# Patient Record
Sex: MALE | Race: WHITE | Employment: UNEMPLOYED | URBAN - METROPOLITAN AREA
[De-identification: names, ages, dates, MRNs, and addresses within clinical notes are randomized per-mention and may not be internally consistent; named-entity substitution may affect disease eponyms.]

---

## 2018-07-12 ENCOUNTER — HOSPITAL ENCOUNTER (OUTPATIENT)
Age: 63
Discharge: HOME | End: 2018-07-14
Attending: EMERGENCY MEDICINE | Admitting: INTERNAL MEDICINE
Payer: MEDICARE

## 2018-07-12 DIAGNOSIS — T42.6X1A VALPROIC ACID TOXICITY, ACCIDENTAL OR UNINTENTIONAL, INITIAL ENCOUNTER: ICD-10-CM

## 2018-07-12 DIAGNOSIS — D72.819 LEUKOPENIA, UNSPECIFIED TYPE: ICD-10-CM

## 2018-07-12 DIAGNOSIS — D75.839 THROMBOCYTHEMIA: Primary | ICD-10-CM

## 2018-07-12 PROBLEM — D61.818 PANCYTOPENIA (HCC): Status: ACTIVE | Noted: 2018-07-12

## 2018-07-12 PROBLEM — I10 HTN (HYPERTENSION): Status: ACTIVE | Noted: 2018-07-12

## 2018-07-12 PROBLEM — K21.9 GERD (GASTROESOPHAGEAL REFLUX DISEASE): Status: ACTIVE | Noted: 2018-07-12

## 2018-07-12 PROBLEM — F31.9 BIPOLAR 1 DISORDER (HCC): Status: ACTIVE | Noted: 2018-07-12

## 2018-07-12 PROBLEM — R56.9 SEIZURES (HCC): Status: ACTIVE | Noted: 2018-07-12

## 2018-07-12 LAB
ALBUMIN SERPL-MCNC: 2.9 G/DL (ref 3.2–4.6)
ALBUMIN/GLOB SERPL: 0.7 {RATIO} (ref 1.2–3.5)
ALP SERPL-CCNC: 100 U/L (ref 50–136)
ALT SERPL-CCNC: 79 U/L (ref 12–65)
ANION GAP SERPL CALC-SCNC: 5 MMOL/L (ref 7–16)
APTT PPP: 30.7 SEC (ref 23.2–35.3)
AST SERPL-CCNC: 60 U/L (ref 15–37)
BASOPHILS # BLD: 0 K/UL (ref 0–0.2)
BASOPHILS NFR BLD: 0 % (ref 0–2)
BILIRUB SERPL-MCNC: 0.4 MG/DL (ref 0.2–1.1)
BUN SERPL-MCNC: 7 MG/DL (ref 8–23)
CALCIUM SERPL-MCNC: 8.1 MG/DL (ref 8.3–10.4)
CHLORIDE SERPL-SCNC: 100 MMOL/L (ref 98–107)
CO2 SERPL-SCNC: 30 MMOL/L (ref 21–32)
CREAT SERPL-MCNC: 0.72 MG/DL (ref 0.8–1.5)
DIFFERENTIAL METHOD BLD: ABNORMAL
EOSINOPHIL # BLD: 0.1 K/UL (ref 0–0.8)
EOSINOPHIL NFR BLD: 2 % (ref 0.5–7.8)
ERYTHROCYTE [DISTWIDTH] IN BLOOD BY AUTOMATED COUNT: 14.4 % (ref 11.9–14.6)
FOLATE SERPL-MCNC: 19.1 NG/ML (ref 3.1–17.5)
GLOBULIN SER CALC-MCNC: 3.9 G/DL (ref 2.3–3.5)
GLUCOSE BLD STRIP.AUTO-MCNC: 162 MG/DL (ref 65–100)
GLUCOSE SERPL-MCNC: 120 MG/DL (ref 65–100)
HCT VFR BLD AUTO: 41.1 % (ref 41.1–50.3)
HGB BLD-MCNC: 14 G/DL (ref 13.6–17.2)
HGB RETIC QN AUTO: 37 PG (ref 29–35)
IMM GRANULOCYTES # BLD: 0 K/UL (ref 0–0.5)
IMM GRANULOCYTES NFR BLD AUTO: 1 % (ref 0–5)
IMM RETICS NFR: 5.6 % (ref 2.3–13.4)
INR PPP: 1.2
LYMPHOCYTES # BLD: 0.5 K/UL (ref 0.5–4.6)
LYMPHOCYTES NFR BLD: 20 % (ref 13–44)
MAGNESIUM SERPL-MCNC: 1.9 MG/DL (ref 1.8–2.4)
MCH RBC QN AUTO: 32.4 PG (ref 26.1–32.9)
MCHC RBC AUTO-ENTMCNC: 34.1 G/DL (ref 31.4–35)
MCV RBC AUTO: 95.1 FL (ref 79.6–97.8)
MONOCYTES # BLD: 0.3 K/UL (ref 0.1–1.3)
MONOCYTES NFR BLD: 12 % (ref 4–12)
NEUTS SEG # BLD: 1.7 K/UL (ref 1.7–8.2)
NEUTS SEG NFR BLD: 65 % (ref 43–78)
PHOSPHATE SERPL-MCNC: 4.3 MG/DL (ref 2.3–3.7)
PLATELET # BLD AUTO: 30 K/UL (ref 150–450)
PMV BLD AUTO: 10.5 FL (ref 10.8–14.1)
POTASSIUM SERPL-SCNC: 3.9 MMOL/L (ref 3.5–5.1)
PROT SERPL-MCNC: 6.8 G/DL (ref 6.3–8.2)
PROTHROMBIN TIME: 15.1 SEC (ref 11.5–14.5)
RBC # BLD AUTO: 4.32 M/UL (ref 4.23–5.67)
RETICS # AUTO: 0.07 M/UL (ref 0.03–0.1)
RETICS/RBC NFR AUTO: 1.5 % (ref 0.3–2)
SODIUM SERPL-SCNC: 135 MMOL/L (ref 136–145)
VALPROATE SERPL-MCNC: 107 UG/ML (ref 50–100)
VIT B12 SERPL-MCNC: 564 PG/ML (ref 193–986)
WBC # BLD AUTO: 2.6 K/UL (ref 4.3–11.1)

## 2018-07-12 PROCEDURE — 83735 ASSAY OF MAGNESIUM: CPT | Performed by: EMERGENCY MEDICINE

## 2018-07-12 PROCEDURE — 85610 PROTHROMBIN TIME: CPT | Performed by: EMERGENCY MEDICINE

## 2018-07-12 PROCEDURE — 80164 ASSAY DIPROPYLACETIC ACD TOT: CPT | Performed by: EMERGENCY MEDICINE

## 2018-07-12 PROCEDURE — 82746 ASSAY OF FOLIC ACID SERUM: CPT | Performed by: INTERNAL MEDICINE

## 2018-07-12 PROCEDURE — 99254 IP/OBS CNSLTJ NEW/EST MOD 60: CPT | Performed by: INTERNAL MEDICINE

## 2018-07-12 PROCEDURE — 85730 THROMBOPLASTIN TIME PARTIAL: CPT | Performed by: EMERGENCY MEDICINE

## 2018-07-12 PROCEDURE — 84100 ASSAY OF PHOSPHORUS: CPT | Performed by: EMERGENCY MEDICINE

## 2018-07-12 PROCEDURE — 85046 RETICYTE/HGB CONCENTRATE: CPT | Performed by: EMERGENCY MEDICINE

## 2018-07-12 PROCEDURE — 82607 VITAMIN B-12: CPT | Performed by: INTERNAL MEDICINE

## 2018-07-12 PROCEDURE — 82962 GLUCOSE BLOOD TEST: CPT

## 2018-07-12 PROCEDURE — 99284 EMERGENCY DEPT VISIT MOD MDM: CPT | Performed by: EMERGENCY MEDICINE

## 2018-07-12 PROCEDURE — 85025 COMPLETE CBC W/AUTO DIFF WBC: CPT | Performed by: EMERGENCY MEDICINE

## 2018-07-12 PROCEDURE — 80053 COMPREHEN METABOLIC PANEL: CPT | Performed by: EMERGENCY MEDICINE

## 2018-07-12 RX ORDER — ACETAMINOPHEN 325 MG/1
650 TABLET ORAL
COMMUNITY

## 2018-07-12 RX ORDER — RISPERIDONE 2 MG/1
TABLET, FILM COATED ORAL
COMMUNITY

## 2018-07-12 RX ORDER — LORAZEPAM 2 MG/1
0.5 TABLET ORAL
COMMUNITY

## 2018-07-12 RX ORDER — HALOPERIDOL 5 MG/1
5 TABLET ORAL
COMMUNITY

## 2018-07-12 RX ORDER — SODIUM CHLORIDE 0.9 % (FLUSH) 0.9 %
5-10 SYRINGE (ML) INJECTION EVERY 8 HOURS
Status: DISCONTINUED | OUTPATIENT
Start: 2018-07-12 | End: 2018-07-13

## 2018-07-12 RX ORDER — MAG HYDROX/ALUMINUM HYD/SIMETH 200-200-20
30 SUSPENSION, ORAL (FINAL DOSE FORM) ORAL
COMMUNITY

## 2018-07-12 RX ORDER — SODIUM CHLORIDE 0.9 % (FLUSH) 0.9 %
5-10 SYRINGE (ML) INJECTION AS NEEDED
Status: DISCONTINUED | OUTPATIENT
Start: 2018-07-12 | End: 2018-07-14 | Stop reason: HOSPADM

## 2018-07-12 RX ORDER — LOPERAMIDE HYDROCHLORIDE 2 MG/1
2 CAPSULE ORAL
COMMUNITY

## 2018-07-12 RX ORDER — FLUTICASONE PROPIONATE 50 MCG
2 SPRAY, SUSPENSION (ML) NASAL DAILY
COMMUNITY

## 2018-07-12 RX ORDER — CLONIDINE HYDROCHLORIDE 0.1 MG/1
0.1 TABLET ORAL 2 TIMES DAILY
COMMUNITY

## 2018-07-12 RX ORDER — AMOXICILLIN 250 MG
2 CAPSULE ORAL
Status: DISCONTINUED | OUTPATIENT
Start: 2018-07-12 | End: 2018-07-14 | Stop reason: HOSPADM

## 2018-07-12 RX ORDER — DIPHENHYDRAMINE HYDROCHLORIDE 50 MG/ML
25 INJECTION, SOLUTION INTRAMUSCULAR; INTRAVENOUS
COMMUNITY

## 2018-07-12 RX ORDER — ZOLPIDEM TARTRATE 5 MG/1
5 TABLET ORAL
Status: DISCONTINUED | OUTPATIENT
Start: 2018-07-12 | End: 2018-07-14 | Stop reason: HOSPADM

## 2018-07-12 RX ORDER — NALOXONE HYDROCHLORIDE 0.4 MG/ML
0.4 INJECTION, SOLUTION INTRAMUSCULAR; INTRAVENOUS; SUBCUTANEOUS AS NEEDED
Status: DISCONTINUED | OUTPATIENT
Start: 2018-07-12 | End: 2018-07-14 | Stop reason: HOSPADM

## 2018-07-12 RX ORDER — LISINOPRIL 10 MG/1
10 TABLET ORAL DAILY
COMMUNITY

## 2018-07-12 RX ORDER — ACETAMINOPHEN 325 MG/1
650 TABLET ORAL
Status: DISCONTINUED | OUTPATIENT
Start: 2018-07-12 | End: 2018-07-14 | Stop reason: HOSPADM

## 2018-07-12 RX ORDER — ONDANSETRON 2 MG/ML
4 INJECTION INTRAMUSCULAR; INTRAVENOUS
Status: DISCONTINUED | OUTPATIENT
Start: 2018-07-12 | End: 2018-07-14 | Stop reason: HOSPADM

## 2018-07-12 RX ORDER — SODIUM CHLORIDE 0.9 % (FLUSH) 0.9 %
5-10 SYRINGE (ML) INJECTION AS NEEDED
Status: DISCONTINUED | OUTPATIENT
Start: 2018-07-12 | End: 2018-07-13

## 2018-07-12 RX ORDER — LORAZEPAM 2 MG/ML
INJECTION INTRAMUSCULAR
COMMUNITY
End: 2018-07-14

## 2018-07-12 RX ORDER — HYDROCODONE BITARTRATE AND ACETAMINOPHEN 5; 325 MG/1; MG/1
1 TABLET ORAL
Status: DISCONTINUED | OUTPATIENT
Start: 2018-07-12 | End: 2018-07-14 | Stop reason: HOSPADM

## 2018-07-12 RX ORDER — LORAZEPAM 1 MG/1
1 TABLET ORAL
Status: DISCONTINUED | OUTPATIENT
Start: 2018-07-12 | End: 2018-07-14 | Stop reason: HOSPADM

## 2018-07-12 RX ORDER — DIPHENHYDRAMINE HYDROCHLORIDE 50 MG/ML
25 INJECTION, SOLUTION INTRAMUSCULAR; INTRAVENOUS
Status: DISCONTINUED | OUTPATIENT
Start: 2018-07-12 | End: 2018-07-14 | Stop reason: HOSPADM

## 2018-07-12 RX ORDER — LANOLIN ALCOHOL/MO/W.PET/CERES
CREAM (GRAM) TOPICAL DAILY
COMMUNITY
End: 2018-07-14

## 2018-07-12 RX ORDER — HALOPERIDOL 5 MG/ML
5 INJECTION INTRAMUSCULAR ONCE
COMMUNITY
End: 2018-07-14

## 2018-07-12 RX ORDER — SODIUM CHLORIDE 0.9 % (FLUSH) 0.9 %
5-10 SYRINGE (ML) INJECTION EVERY 8 HOURS
Status: DISCONTINUED | OUTPATIENT
Start: 2018-07-12 | End: 2018-07-14 | Stop reason: HOSPADM

## 2018-07-12 RX ADMIN — Medication 5 ML: at 22:00

## 2018-07-12 NOTE — PROGRESS NOTES
TRANSFER - IN REPORT:    Verbal report received from Archie Ngo RN(name) on Stuart Ahumada  being received from ED(unit) for routine progression of care      Report consisted of patients Situation, Background, Assessment and   Recommendations(SBAR). Information from the following report(s) SBAR and Kardex was reviewed with the receiving nurse. Opportunity for questions and clarification was provided. Assessment completed upon patients arrival to unit and care assumed.

## 2018-07-12 NOTE — CONSULTS
[facility]: In Patient Hematology / Oncology Consult Note    Reason for Consult: Thrombocytopenia, leukopenia  Referring Physician:  Marysol Mendiola MD    History of Present Illness:  57 yo male who is referred her from [address] [facility] for petechial rash eval.  Pt states he noticed rash bilat ant shins 1 1/2 days ago. No prior hx of same. He is on mult psyche meds including valproc acid. States he has been on these meds long time and no change in dose (although pt does not appear to be reliable historian, nor is he knowledgeable about the names of the meds he is taking). Pt CBC noteworthy for WBC 2.6, Hb 14, plt 30, MPV 10.5. There is no prior lab to compare, and hematology was consulted.     Valproic Acid level 107 (N=)  Pt denies fevers or chills. No weight loss. Review of Systems:  Constitutional Denies fever or chills. Denies weight loss or appetite changes. Denies fatigue. Denies anorexia. HEENT Denies trauma, bluring vision, hearing loss, ear pain, nosebleeds, sore throat, neck pain and ear discharge. Skin Lower leg rash b/l. Lungs Denies shortness of breath, cough, sputum production or hemoptysis. Cardiovascular Denies chest pain, palpitations, orthopnea, claudication and leg swelling. Gastrointestinal Denies nausea, vomiting, bowel changes. Denies bloody or black stools. Denies abdominal pain.  Denies dysuria, frequency or hesitancy of urination   Neuro Denies headaches, visual changes or ataxia. Denies dizziness, tingling, tremors, sensory change, speech change, focal weakness and headaches. Hematology Denies nasal/gum bleeding, denies easy bruise   Endo Denies heat/cold intolerance, denies diabetes. MSK Denies back pain, swollen legs, myalgias and falls. Psychiatric/Behavioral Denies depression and substance abuse. The patient is not nervous/anxious.        No Known Allergies  Past Medical History:   Diagnosis Date    Gastrointestinal disorder     GERD    Hypertension     Psychiatric disorder     bipolar    Seizures ([facility] [address]      Past Surgical History:   Procedure Laterality Date    HX ORTHOPAEDIC      face     History reviewed. No pertinent family history. Social History     Social History    Marital status: SINGLE     Spouse name: N/A    Number of children: N/A    Years of education: N/A     Occupational History    Not on file.      Social History Main Topics    Smoking status: Never Smoker    Smokeless tobacco: Never Used    Alcohol use Yes    Drug use: No    Sexual activity: Not on file     Other Topics Concern    Not on file     Social History Narrative    No narrative on file     Current Facility-Administered Medications   Medication Dose Route Frequency Provider Last Rate Last Dose    sodium chloride (NS) flush 5-10 mL  5-10 mL IntraVENous Q8H Chante Segura MD        sodium chloride (NS) flush 5-10 mL  5-10 mL IntraVENous PRN Chante Segura MD        sodium chloride (NS) flush 5-10 mL  5-10 mL IntraVENous Q8H Norah Good MD        sodium chloride (NS) flush 5-10 mL  5-10 mL IntraVENous PRN Norah Good MD        acetaminophen (TYLENOL) tablet 650 mg  650 mg Oral Q4H PRN Norah Good MD        HYDROcodone-acetaminophen [facility]) 5-325 mg per tablet 1 Tab  1 Tab Oral Q4H PRN Norah Good MD        naloxone [facility]) injection 0.4 mg  0.4 mg IntraVENous PRN Norah Good MD        diphenhydrAMINE (BENADRYL) injection 25 mg  25 mg IntraVENous Q4H PRN Norah Good MD        ondansetron [facility]) injection 4 mg  4 mg IntraVENous Q4H PRN Norah Good MD        senna-docusate (PERICOLACE) 8.6-50 mg per tablet 2 Tab  2 Tab Oral DAILY PRN Norah Good MD        LORazepam (ATIVAN) tablet 1 mg  1 mg Oral Q4H PRN Norah Good MD        zolpidem (AMBIEN) tablet 5 mg  5 mg Oral QHS PRN Norah Good MD           OBJECTIVE:  Patient Vitals for the past 8 hrs:   BP Temp Pulse Resp SpO2   07/12/18 1718 (!) 155/101 97.4 °F (36.3 °C) 74 18 97 %   18 1702 140/90 97.9 °F (36.6 °C) 75 16 98 %     Temp (24hrs), Av.7 °F (36.5 °C), Min:97.4 °F (36.3 °C), Max:97.9 °F (36.6 °C)         Physical Exam:  Constitutional: Oriented to person, place, and time. Well-developed and well-nourished. HEENT: Normocephalic and atraumatic. Oropharynx is clear and moist.   Conjunctivae and EOM are normal. Pupils are equal, round, and reactive to light. No scleral icterus. Neck supple. No JVD present. No tracheal deviation present. No thyromegaly present. Lymph node   No palpable submandibular, cervical, supraclavicular, axillary and inguinal lymph nodes. Skin Lower leg rash b/l. Warm and dry. No pallor. Respiratory Effort normal and breath sounds normal.  No respiratory distress. No wheezes. No rales. No tenderness. CVS Normal rate, regular rhythm and normal heart sounds. Exam reveals no gallop, no friction and no rub. No murmur heard. Abdomen Soft. Bowel sounds are normal. Exhibits no distension. There is no tenderness. There is no rebound and no guarding. Neuro Normal reflexes. No cranial nerve deficit. Exhibits normal muscle tone, 5 of 5 strength of all extremities. MSK Normal range of motion. No edema and no tenderness.    Psych Normal mood, affect, behavior, judgment and thought content      Labs:  Recent Labs      18   1435   WBC  2.6*   RBC  4.32   HGB  14.0   HCT  41.1   MCV  95.1   MCH  32.4   MCHC  34.1   RDW  14.4   PLT  30*   GRANS  65   LYMPH  20   MONOS  12   EOS  2   BASOS  0   IG  1   DF  AUTOMATED   ANEU  1.7   ABL  0.5   ABM  0.3   JARRED  0.1   ABB  0.0   AIG  0.0    Recent Labs      18   1435   NA  135*   K  3.9   CL  100   CO2  30   AGAP  5*   GLU  120*   BUN  7*   CREA  0.72*   GFRAA  >60   GFRNA  >60   CA  8.1*   SGOT  60*   AP  100   TP  6.8   ALB  2.9*   GLOB  3.9*   AGRAT  0.7*   MG  1.9   PHOS  4.3*       ASSESSMENT/RECOMMENDATION:    Active Problems: Pancytopenia ([facility] (7/12/2018)      Bipolar 1 disorder ([facility] (7/12/2018)      HTN (hypertension) (7/12/2018)      GERD (gastroesophageal reflux disease) (7/12/2018)      Seizures ([facility] (7/12/2018)    59 y/o M consulted for thrombocytopenia and leukopenia. Given no prior lab to compare to, unclear being chronic or acute, current level need no intervention, ddx include drug effect, valproic acid could potentially cause cytopenia and rash, rec change to keppra or other anti-seizure meds, consider neurology consult given seizure was his main concern for work, also check B12/FA, MDS is possible but unlikely and would not pursue marrow biopsy at this point. Thank you for allowing me to participate in the care of Mr. Fany Win. Zeferino Eason M.D.   [address]  Office : [phone]  Fax : [phone]

## 2018-07-12 NOTE — ROUTINE PROCESS
TRANSFER - OUT REPORT:    Verbal report given to [facility] on Chris Lacy  being transferred to [facility] for routine progression of care       Report consisted of patients Situation, Background, Assessment and   Recommendations(SBAR). Information from the following report(s) SBAR was reviewed with the receiving nurse. Lines:   Peripheral IV 07/12/18 Left Antecubital (Active)   Site Assessment Clean, dry, & intact 7/12/2018  2:40 PM   Phlebitis Assessment 0 7/12/2018  2:40 PM   Infiltration Assessment 0 7/12/2018  2:40 PM   Dressing Status Clean, dry, & intact 7/12/2018  2:40 PM        Opportunity for questions and clarification was provided.

## 2018-07-12 NOTE — PROGRESS NOTES
07/12/18 1740   Dual Skin Pressure Injury Assessment   Dual Skin Pressure Injury Assessment X  (red rashes to both lower extremities; denies itchiness)   Second Care Provider (Based on [address] [place] [address]) Haim Ghotra RN

## 2018-07-12 NOTE — PROGRESS NOTES
Patient received from ED to room 345 via wheelchair. Patient alert & oriented, respirations easy & regular, lungs CTA bilaterally. Assessment completed. Skin intact except BLE with red petechial rashes, pt denies itchiness. Denies pain. Call light within reach, instructed to call for assistance as needed. Sitter at bedside. Pt instructed how to call for assistance and for meals.

## 2018-07-12 NOTE — H&P
Hospitalist H&P Note Admit Date:  2018  2:34 PM  
Name:  Deena Fernandes Age:  58 y.o. 
:  1955 MRN:  [id number] PCP:  Jen Sesay MD 
Treatment Team: Attending Provider: Blanche Mcguire MD; Primary Nurse: Antonia Fragoso; Primary Nurse: Lucia Clements RN 
 
HPI:  
CC:  Leg rash HPI:  57 yo male who is [facility] her [facility]ial rash eval.  Pt states he noticed rash bilat ant shins 1 1/2 days ago. No prior hx of same. He is on mult psyche meds including valproc acid. States he has been on these meds long time and no change in dose (although pt does not appear to be reliable historian, nor is he knowledgeable about the names of the meds he is taking). Pt CBC noteworthy for WBC 2.6, Hb 14, plt 30, MPV 10.5. I do not see old labs to compare. Valproic Acid level 107 (N=) Pt denies fevers or chills. No weight loss. States \"I'm in the best health of my life\" 10 systems reviewed and negative except as noted in HPI. Past Medical History:  
Diagnosis Date  Gastrointestinal disorder GERD  Hypertension  Psychiatric disorder   
 bipolar  Seizures ([facility] [address] Past Surgical History:  
Procedure Laterality Date  HX ORTHOPAEDIC    
 face No Known Allergies Social History Substance Use Topics  Smoking status: Never Smoker  Smokeless tobacco: Never Used  Alcohol use Yes History reviewed. No pertinent family history. There is no immunization history on file for this patient. PTA Medications: 
Prior to Admission Medications Prescriptions Last Dose Informant Patient Reported? Taking? LORazepam (ATIVAN) 2 mg tablet   Yes Yes Sig: Take 0.5 mg by mouth every six (6) hours as needed for Anxiety. LORazepam (ATIVAN) 2 mg/mL injection   Yes Yes Sig: by IntraVENous route every four (4) hours as needed. acetaminophen (TYLENOL) 325 mg tablet   Yes Yes Sig: Take 650 mg by mouth every four (4) hours as needed for Pain. alum-mag hydroxide-simeth (MYLANTA) 200-200-20 mg/5 mL susp   Yes Yes Sig: Take 30 mL by mouth every four (4) hours as needed. cloNIDine HCl (CATAPRES) 0.1 mg tablet   Yes Yes Sig: Take 0.1 mg by mouth two (2) times a day. diphenhydrAMINE (BENADRYL) 50 mg/mL syrg   Yes Yes Si mg by Injection route. fluticasone (FLONASE ALLERGY RELIEF) 50 mcg/actuation nasal spray   Yes Yes Si Sprays by Both Nostrils route daily. haloperidol (HALDOL) 5 mg tablet   Yes Yes Sig: Take 5 mg by mouth every four (4) hours as needed.  
haloperidol lactate (HALDOL) 5 mg/mL injection   Yes Yes Si mg by IntraMUSCular route once. If pt refuses PO risperidone. lisinopril (PRINIVIL, ZESTRIL) 10 mg tablet   Yes Yes Sig: Take 10 mg by mouth daily. loperamide (IMODIUM) 2 mg capsule   Yes Yes Sig: Take 2 mg by mouth. risperiDONE (RISPERDAL) 2 mg tablet   Yes Yes Sig: Take  by mouth. Takes 2 mg in am, takes 4 mg at bedtime. thiamine (B-1) 100 mg tablet   Yes Yes Sig: Take  by mouth daily. valproic acid, as sodium salt, 250 mg/5 mL syrg   Yes Yes Sig: Take  by mouth. Facility-Administered Medications: None Objective:  
No data found. No intake or output data in the 24 hours ending 18 1700 Physical Exam: 
General:    Well nourished. Alert. Oriented. Eyes:   Normal sclera. Extraocular movements intact. ENT:  Normocephalic, atraumatic. Moist mucous membranes CV:   RRR. No murmur, rub, or gallop. Lungs:  CTAB. No wheezing, rhonchi, or rales. Abdomen: Soft, nontender, nondistended. Bowel sounds normal.  
Extremities: Warm and dry. No cyanosis or edema. Neurologic: CN II-XII grossly intact. Sensation intact. Skin:     Diffuse petechial rash, most notable on bilat ant chins Psych:  Mood elevated at times. Tangential thought process. I reviewed the labs, imaging, EKGs, telemetry, and other studies done this admission.  
Data Review:  
Recent Results (from the past 24 hour(s)) CBC WITH AUTOMATED DIFF Collection Time: 07/12/18  2:35 PM  
Result Value Ref Range WBC 2.6 (L) 4.3 - 11.1 K/uL  
 RBC 4.32 4.23 - 5.67 M/uL  
 HGB 14.0 13.6 - 17.2 g/dL HCT 41.1 41.1 - 50.3 % MCV 95.1 79.6 - 97.8 FL  
 MCH 32.4 26.1 - 32.9 PG  
 MCHC 34.1 31.4 - 35.0 g/dL  
 RDW 14.4 11.9 - 14.6 % PLATELET 30 (LL) 096 - 450 K/uL MPV 10.5 (L) 10.8 - 14.1 FL  
 DF AUTOMATED NEUTROPHILS 65 43 - 78 % LYMPHOCYTES 20 13 - 44 % MONOCYTES 12 4.0 - 12.0 % EOSINOPHILS 2 0.5 - 7.8 % BASOPHILS 0 0.0 - 2.0 % IMMATURE GRANULOCYTES 1 0.0 - 5.0 %  
 ABS. NEUTROPHILS 1.7 1.7 - 8.2 K/UL  
 ABS. LYMPHOCYTES 0.5 0.5 - 4.6 K/UL  
 ABS. MONOCYTES 0.3 0.1 - 1.3 K/UL  
 ABS. EOSINOPHILS 0.1 0.0 - 0.8 K/UL  
 ABS. BASOPHILS 0.0 0.0 - 0.2 K/UL  
 ABS. IMM. GRANS. 0.0 0.0 - 0.5 K/UL METABOLIC PANEL, COMPREHENSIVE Collection Time: 07/12/18  2:35 PM  
Result Value Ref Range Sodium 135 (L) 136 - 145 mmol/L Potassium 3.9 3.5 - 5.1 mmol/L Chloride 100 98 - 107 mmol/L  
 CO2 30 21 - 32 mmol/L Anion gap 5 (L) 7 - 16 mmol/L Glucose 120 (H) 65 - 100 mg/dL BUN 7 (L) 8 - 23 MG/DL Creatinine 0.72 (L) 0.8 - 1.5 MG/DL  
 GFR est AA >60 >60 ml/min/1.73m2 GFR est non-AA >60 >60 ml/min/1.73m2 Calcium 8.1 (L) 8.3 - 10.4 MG/DL Bilirubin, total 0.4 0.2 - 1.1 MG/DL  
 ALT (SGPT) 79 (H) 12 - 65 U/L  
 AST (SGOT) 60 (H) 15 - 37 U/L Alk. phosphatase 100 50 - 136 U/L Protein, total 6.8 6.3 - 8.2 g/dL Albumin 2.9 (L) 3.2 - 4.6 g/dL Globulin 3.9 (H) 2.3 - 3.5 g/dL A-G Ratio 0.7 (L) 1.2 - 3.5 PROTHROMBIN TIME + INR Collection Time: 07/12/18  2:35 PM  
Result Value Ref Range Prothrombin time 15.1 (H) 11.5 - 14.5 sec INR 1.2 PTT Collection Time: 07/12/18  2:35 PM  
Result Value Ref Range aPTT 30.7 23.2 - 35.3 SEC PHOSPHORUS Collection Time: 07/12/18  2:35 PM  
Result Value Ref Range  Phosphorus 4.3 (H) 2.3 - 3.7 MG/DL MAGNESIUM Collection Time: 07/12/18  2:35 PM  
Result Value Ref Range Magnesium 1.9 1.8 - 2.4 mg/dL RETICULOCYTE COUNT Collection Time: 07/12/18  2:35 PM  
Result Value Ref Range Reticulocyte count 1.5 0.3 - 2.0 % Absolute Retic Cnt. 0.0657 0.026 - 0.095 M/ul Immature Retic Fraction 5.6 2.3 - 13.4 % Retic Hgb Conc. 37 (H) 29 - 35 pg  
VALPROIC ACID Collection Time: 07/12/18  2:35 PM  
Result Value Ref Range Valproic acid 107 (H) 50 - 100 ug/ml All Micro Results None Other Studies: No results found. Assessment and Plan:  
 
Hospital Problems as of 7/12/2018  Never Reviewed Codes Class Noted - Resolved POA Pancytopenia (HCC) 
-acutally bicytopenia. Concern for drug induced. Unsure how acute. Will consult hematology for eval. 
-?other hematologic process ICD-10-CM: J74.893 ICD-9-CM: 284.19  7/12/2018 - Present Unknown Bipolar 1 disorder ([facility] 
-continue meds ICD-10-CM: F31.9 ICD-9-CM: 296.7  7/12/2018 - Present Unknown HTN (hypertension) -monitor ICD-10-CM: I10 
ICD-9-CM: 401.9  7/12/2018 - Present Unknown GERD (gastroesophageal reflux disease) ICD-10-CM: K21.9 ICD-9-CM: 530.81  7/12/2018 - Present Unknown Seizures ([facility] -monitor 
-Valproci acid held tonight, level is high normal.  Will consider restart pending heme eval ICD-10-CM: R56.9 ICD-9-CM: 780.39  7/12/2018 - Present Unknown PLAN: 
·  
 
DVT ppx:  No indicated Anticipated DC needs:   
Code status:  Full Estimated LOS:  1 day Risk:  high Signed: 
Yulisa Hodge MD

## 2018-07-12 NOTE — IP AVS SNAPSHOT
Marilu White 
 
 
 [address] [place] [address] 
[phone] Patient: Kaylee Triana MRN: [id number]:[id number] About your hospitalization You were admitted on:  July 12, 2018 You last received care in the:  [address] [place] [address] You were discharged on:  July 14, 2018 Why you were hospitalized Your primary diagnosis was:  Not on File Your diagnoses also included:  Pancytopenia (Hcc), Bipolar 1 Disorder (Hcc), Htn (Hypertension), Gerd (Gastroesophageal Reflux Disease), Seizures (Hcc) Follow-up Information Follow up With Details Comments Contact Info Ken Corbett MD Schedule an appointment as soon as possible for a visit in 1 week also need CBC rechecked in 1 week and in 3 weeks Patient can only remember the practice name and not the physician [facility] On 7/17/2018 2 pm [phone] Ame Horowitz MD In 3 weeks follow up in 3 weeks only if platelets and/or CBC blood counts do not recover. [id number] [address] [address] 
[phone] Discharge Orders Procedure Order Date Status Priority Quantity Spec Type Associated Dx CBC WITH AUTOMATED DIFF 07/13/18 1601 Future Routine 1 Blood Comments: Follow up low platelet and low wbc  
 CBC WITH AUTOMATED DIFF 07/13/18 1601 Future Routine 1 Blood Comments: Follow up low platelet and low wbc A check philipp indicates which time of day the medication should be taken. My Medications CHANGE how you take these medications Instructions Each Dose to Equal  
 Morning Noon Evening Bedtime ATIVAN 2 mg tablet Generic drug:  LORazepam  
What changed:  Another medication with the same name was removed. Continue taking this medication, and follow the directions you see here. Your last dose was: Your next dose is: Take 0.5 mg by mouth every six (6) hours as needed for Anxiety.   
 0.5 mg  
    
   
 CONTINUE taking these medications Instructions Each Dose to Equal  
 Morning Noon Evening Bedtime  
 alum-mag hydroxide-simeth 200-200-20 mg/5 mL Susp Commonly known as:  MYLANTA Your last dose was: Your next dose is: Take 30 mL by mouth every four (4) hours as needed. 30 mL  
    
   
   
   
  
 cloNIDine HCl 0.1 mg tablet Commonly known as:  CATAPRES Your last dose was: Your next dose is: Take 0.1 mg by mouth two (2) times a day. 0.1 mg  
    
   
   
   
  
 diphenhydrAMINE 50 mg/mL Syrg Commonly known as:  BENADRYL Your last dose was: Your next dose is:    
   
   
 25 mg by Injection route. 25 mg  
    
   
   
   
  
 FLONASE ALLERGY RELIEF 50 mcg/actuation nasal spray Generic drug:  fluticasone Your last dose was: Your next dose is: 2 Sprays by Both Nostrils route daily. 2 Spray  
    
   
   
   
  
 haloperidol 5 mg tablet Commonly known as:  HALDOL Your last dose was: Your next dose is: Take 5 mg by mouth every four (4) hours as needed. 5 mg  
    
   
   
   
  
 lisinopril 10 mg tablet Commonly known as:  Pilar Pinch Your last dose was: Your next dose is: Take 10 mg by mouth daily. 10 mg  
    
   
   
   
  
 loperamide 2 mg capsule Commonly known as:  IMODIUM Your last dose was: Your next dose is: Take 2 mg by mouth. 2 mg  
    
   
   
   
  
 risperiDONE 2 mg tablet Commonly known as:  RisperDAL Your last dose was: Your next dose is: Take  by mouth. Takes 2 mg in am, takes 4 mg at bedtime. TYLENOL 325 mg tablet Generic drug:  acetaminophen Your last dose was: Your next dose is: Take 650 mg by mouth every four (4) hours as needed for Pain.   
 650 mg  
    
   
   
   
  
  
 STOP taking these medications   
 haloperidol lactate 5 mg/mL injection Commonly known as:  HALDOL  
   
  
 thiamine  mg tablet Commonly known as:  B-1  
   
  
 valproic acid (as sodium salt) 250 mg/5 mL Syrg Discharge Instructions DISCHARGE SUMMARY from Nurse PATIENT INSTRUCTIONS: 
 
 
F-face looks uneven A-arms unable to move or move unevenly S-speech slurred or non-existent T-time-call 911 as soon as signs and symptoms begin-DO NOT go Back to bed or wait to see if you get better-TIME IS BRAIN. Warning Signs of HEART ATTACK Call 911 if you have these symptoms: 
? Chest discomfort. Most heart attacks involve discomfort in the center of the chest that lasts more than a few minutes, or that goes away and comes back. It can feel like uncomfortable pressure, squeezing, fullness, or pain. ? Discomfort in other areas of the upper body. Symptoms can include pain or discomfort in one or both arms, the back, neck, jaw, or stomach. ? Shortness of breath with or without chest discomfort. ? Other signs may include breaking out in a cold sweat, nausea, or lightheadedness. Don't wait more than five minutes to call [address]! Fast action can save your life. Calling 911 is almost always the fastest way to get lifesaving treatment. Emergency Medical Services staff can begin treatment when they arrive  up to an hour sooner than if someone gets to the hospital by car. The discharge information has been reviewed with the patient. The patient verbalized understanding. Discharge medications reviewed with the patient and appropriate educational materials and side effects teaching were provided. ___________________________________________________________________________________________________________________________________ MyChart Announcement We are excited to announce that we are making your provider's discharge notes available to you in MyNines. You will see these notes when they are completed and signed by the physician that discharged you from your recent hospital stay. If you have any questions or concerns about any information you see in Resident Researchhart, please call the Health Information Department where you were seen or reach out to your Primary Care Provider for more information about your plan of care. Introducing [facility]! [employer] introduces MyNines patient portal. Now you can access parts of your medical record, email your doctor's office, and request medication refills online. 1. In your internet browser, go to [email or web address] 2. Click on the First Time User? Click Here link in the Sign In box. You will see the New Member Sign Up page. 3. Enter your MyNines Access Code exactly as it appears below. You will not need to use this code after youve completed the sign-up process. If you do not sign up before the expiration date, you must request a new code. · MyNines Access Code: [email or web address] Expires: 10/10/2018  2:29 PM 
 
4. Enter the last four digits of your Social Security Number (xxxx) and Date of Birth (mm/dd/yyyy) as indicated and click Submit. You will be taken to the next sign-up page. 5. Create a Smash Buckett ID. This will be your MyNines login ID and cannot be changed, so think of one that is secure and easy to remember. 6. Create a Smash Buckett password. You can change your password at any time. 7. Enter your Password Reset Question and Answer. This can be used at a later time if you forget your password. 8. Enter your e-mail address. You will receive e-mail notification when new information is available in [address] 9. Click Sign Up. You can now view and download portions of your medical record. 10. Click the Download Summary menu link to download a portable copy of your medical information. If you have questions, please visit the Frequently Asked Questions section of the "[employer]"t website. Remember, [employer] is NOT to be used for urgent needs. For medical emergencies, dial 911. Now available from your iPhone and Android! Introducing Matt Hughes As a [employer] patient, I wanted to make you aware of our electronic visit tool called Matt Hughes. [employer] 24/7 allows you to connect within minutes with a medical provider 24 hours a day, seven days a week via a mobile device or tablet or logging into a secure website from your computer. You can access Matt Hughes from anywhere in the [place]. A virtual visit might be right for you when you have a simple condition and feel like you just dont want to get out of bed, or cant get away from work for an appointment, when your regular [employer] provider is not available (evenings, weekends or holidays), or when youre out of town and need minor care. Electronic visits cost only $49 and if the [employer] 24/7 provider determines a prescription is needed to treat your condition, one can be electronically transmitted to a nearby pharmacy*. Please take a moment to enroll today if you have not already done so. The enrollment process is free and takes just a few minutes. To enroll, please download the [employer] 24/7 kwame to your tablet or phone, or visit [email or web address] to enroll on your computer. And, as an [address] [place] [address] patient with a [employer] account, the results of your visits will be scanned into your electronic medical record and your primary care provider will be able to view the scanned results.    
We urge you to continue to see your regular [employer] provider for your ongoing medical care. And while your primary care provider may not be the one available when you seek a Matt Morafin virtual visit, the peace of mind you get from getting a real diagnosis real time can be priceless. For more information on Matt Morafin, view our Frequently Asked Questions (FAQs) at [email or web address]. Sincerely, 
 
Starla Huntley MD 
Chief Medical Officer Miri Charlton *:  certain medications cannot be prescribed via [employer] Providers Seen During Your Hospitalization Provider Specialty Primary office phone Blanche Mcguire MD Emergency Medicine [phone] Niels Hawley MD Internal Medicine [phone] Your Primary Care Physician (PCP) Primary Care Physician Office Phone Office Fax OTHER, PHYS ** None ** ** None ** You are allergic to the following No active allergies Recent Documentation Smoking Status Never Smoker Emergency Contacts Name Discharge Info Relation Home Work Mobile None,Per Patient N/A  AT THIS TIME [6] Other Relative [6] Patient Belongings The following personal items are in your possession at time of discharge: 
  Dental Appliances: None  Visual Aid: Glasses, With patient      Home Medications: None   Jewelry: None  Clothing: With patient    Other Valuables: None Discharge Instructions Attachments/References HAND-WASHING: GENERAL INFO (ENGLISH) RASH (ENGLISH) Patient Handouts Learning About Hand-Washing What is hand-washing? Hand-washing is more than just running water over your hands. Washing your hands with soap and water is the best way to prevent the spread of infections. It helps prevent diseases, such as colds, flu, and food poisoning. It's easy, it doesn't cost much, and it works. When should you wash your hands? Wash your hands: · Often, especially during cold and flu season. This can reduce your risk of catching or spreading a cold or the flu. · Before, during, and after you prepare food. This reduces your risk of catching or spreading bacteria that cause food poisoning. Be especially careful to wash before and after you prepare poultry, raw eggs, meat, or seafood. · Before and after you care for someone who is sick. It's also important to wash your hands before and after you treat a cut or wound. · Before you eat. Wash your hands after you: · Go to the bathroom or change diapers. This reduces your risk of catching or spreading diseases such as salmonella or hepatitis A. 
· Cough, sneeze, or blow your nose. · Handle or prepare foods, especially after you touch raw meat, poultry, fish, shellfish, or eggs. · Touch an animal, animal waste, pet food, or pet treats. · Handle garbage, use the phone, or shake hands. What's the best way to wash your hands? You can wash your hands so that it kills the most germs. Just follow these simple steps. 1. Wet your hands with running water, and apply soap. 2. Rub your hands together to make a lather. Scrub well for at least 20 seconds. 3. Pay special attention to your wrists, the backs of your hands, between your fingers, and under your fingernails. 4. Rinse your hands well under running water. 5. Use a clean towel to dry your hands, or air-dry your hands. You may want to use a clean towel as a barrier between the faucet and your clean hands when you turn off the water. If soap and water are not available, you can use a hand  or alcohol-based hand wipes that contain at least 60% ethyl alcohol or isopropanol. The alcohol kills many types of germs on your hands, but it doesn't get rid of all types of germs. When you can see dirt on your hands, or if your hands are greasy, it's better to wash with soap and water.  
If you use , put some on your hand and then rub your hands and fingers until they are dry. You don't need to use water. Where can you learn more? Go to http://mani-abiel.info/. Enter  in the search box to learn more about \"Learning About Hand-Washing. \" Current as of: November 18, 2017 Content Version: 11.7 © 5053-5409 Pager. Care instructions adapted under license by [employer] (which disclaims liability or warranty for this information). If you have questions about a medical condition or this instruction, always ask your healthcare professional. [address] any warranty or liability for your use of this information. Rash: Care Instructions Your Care Instructions A rash is any irritation or inflammation of the skin. Rashes have many possible causes, including allergy, infection, illness, heat, and emotional stress. Follow-up care is a key part of your treatment and safety. Be sure to make and go to all appointments, and call your doctor if you are having problems. It's also a good idea to know your test results and keep a list of the medicines you take. How can you care for yourself at home? · Wash the area with water only. Soap can make dryness and itching worse. Pat dry. · Put cold, wet cloths on the rash to reduce itching. · Keep cool, and stay out of the sun. · Leave the rash open to the air as much of the time as possible. · Sometimes petroleum jelly (Vaseline) can help relieve the discomfort caused by a rash. A moisturizing lotion, such as Cetaphil, also may help. Calamine lotion may help for rashes caused by contact with something (such as a plant or soap) that irritated the skin. Use it 3 or 4 times a day. · If your doctor prescribed a cream, use it as directed. If your doctor prescribed medicine, take it exactly as directed.  
· If your rash itches so badly that it interferes with your normal activities, take an over-the-counter antihistamine, such as diphenhydramine (Benadryl) or loratadine (Claritin). Read and follow all instructions on the label. When should you call for help? Call your doctor now or seek immediate medical care if: 
  · You have signs of infection, such as: 
¨ Increased pain, swelling, warmth, or redness. ¨ Red streaks leading from the area. ¨ Pus draining from the area. ¨ A fever.  
  · You have joint pain along with the rash.  
 Watch closely for changes in your health, and be sure to contact your doctor if: 
  · Your rash is changing or getting worse. For example, call if you have pain along with the rash, the rash is spreading, or you have new blisters.  
  · You do not get better after 1 week. Where can you learn more? Go to http://mani-abiel.info/. Enter K848 in the search box to learn more about \"Rash: Care Instructions. \" Current as of: October 5, 2017 Content Version: 11.7 © 3513-5997 Jildy. Care instructions adapted under license by [employer] (which disclaims liability or warranty for this information). If you have questions about a medical condition or this instruction, always ask your healthcare professional. [address] any warranty or liability for your use of this information. Please provide this summary of care documentation to your next provider. Signatures-by signing, you are acknowledging that this After Visit Summary has been reviewed with you and you have received a copy. Patient Signature:  ____________________________________________________________ Date:  ____________________________________________________________  
  
Chris Shrestha Provider Signature:  ____________________________________________________________ Date:  ____________________________________________________________

## 2018-07-12 NOTE — ED PROVIDER NOTES
HPI Comments: 59-year-old child presents with concerns from the [facility] for behavioral health about having pancytopenia. Patient has been getting treatment since mid June for psychiatric illness. He has been on a long list of medications. Apparently the facility did some routine blood work and saw that several cell lines appear to be low. Patient says he has no symptoms and overall feels fine. He did note a rash on both lower legs. Elements of this note were created using speech recognition software. As such, errors of speech recognition may be present. Patient is a 58 y.o. male presenting with abnormal lab results. The history is provided by the patient. Abnormal Lab Results   Pertinent negatives include no chest pain, no abdominal pain, no headaches and no shortness of breath. Past Medical History:   Diagnosis Date    Gastrointestinal disorder     GERD    Hypertension     Psychiatric disorder     bipolar    Seizures ([address]        Past Surgical History:   Procedure Laterality Date    HX ORTHOPAEDIC      face         History reviewed. No pertinent family history. Social History     Social History    Marital status: N/A     Spouse name: N/A    Number of children: N/A    Years of education: N/A     Occupational History    Not on file. Social History Main Topics    Smoking status: Never Smoker    Smokeless tobacco: Never Used    Alcohol use Yes    Drug use: No    Sexual activity: Not on file     Other Topics Concern    Not on file     Social History Narrative    No narrative on file         ALLERGIES: Review of patient's allergies indicates no known allergies. Review of Systems   Constitutional: Negative for chills, diaphoresis and fever. HENT: Negative for congestion, rhinorrhea and sore throat. Eyes: Negative for redness and visual disturbance. Respiratory: Negative for cough, chest tightness, shortness of breath and wheezing.     Cardiovascular: Negative for chest pain and palpitations. Gastrointestinal: Negative for abdominal pain, blood in stool, diarrhea, nausea and vomiting. Endocrine: Negative for polydipsia and polyuria. Genitourinary: Negative for dysuria and hematuria. Musculoskeletal: Negative for arthralgias, myalgias and neck stiffness. Skin: Positive for rash. Negative for color change. Allergic/Immunologic: Negative for environmental allergies and food allergies. Neurological: Negative for dizziness, weakness and headaches. Hematological: Negative for adenopathy. Does not bruise/bleed easily. Psychiatric/Behavioral: Negative for confusion and sleep disturbance. The patient is not nervous/anxious. There were no vitals filed for this visit. Physical Exam   Constitutional: He is oriented to person, place, and time. He appears well-developed and well-nourished. HENT:   Head: Normocephalic and atraumatic. Eyes: Conjunctivae and EOM are normal. Pupils are equal, round, and reactive to light. Neck: Normal range of motion. Cardiovascular: Normal rate and regular rhythm. Pulmonary/Chest: Effort normal and breath sounds normal. No respiratory distress. He has no wheezes. He has no rales. He exhibits no tenderness. Abdominal: Soft. Bowel sounds are normal. There is no rebound and no guarding. Musculoskeletal: Normal range of motion. He exhibits no edema or tenderness. Lymphadenopathy:     He has no cervical adenopathy. Neurological: He is alert and oriented to person, place, and time. Skin: Skin is warm and dry. Rash noted. Bilateral lower extremity petechial rash from his proximal shins onto the top of his feet   Psychiatric: He has a normal mood and affect. Nursing note and vitals reviewed. MDM  Number of Diagnoses or Management Options  Leukopenia, unspecified type:    Thrombocythemia ([facility] [address]):   Valproic acid toxicity, accidental or unintentional, initial encounter:   Diagnosis management comments: Culture is still lines as well as reticulocyte count and other blood work. Patient's platelet count and white blood cell count are low. His hemoglobin is normal.  His valproic acid level is elevated so I will discuss the case with the hospitalist for admission. I discussed the case with the hospitalist who kindly agreed to see the patient. I will also page hematology.         ED Course       Procedures

## 2018-07-12 NOTE — IP AVS SNAPSHOT
[address] [facility] [address] 
[phone] Patient: Rosendo Larsen MRN: [id number] OOO:[birth year] A check philipp indicates which time of day the medication should be taken. My Medications CHANGE how you take these medications Instructions Each Dose to Equal  
 Morning Noon Evening Bedtime ATIVAN 2 mg tablet Generic drug:  LORazepam  
What changed:  Another medication with the same name was removed. Continue taking this medication, and follow the directions you see here. Your last dose was: Your next dose is: Take 0.5 mg by mouth every six (6) hours as needed for Anxiety. 0.5 mg  
    
   
   
   
  
  
CONTINUE taking these medications Instructions Each Dose to Equal  
 Morning Noon Evening Bedtime  
 alum-mag hydroxide-simeth 200-200-20 mg/5 mL Susp Commonly known as:  MYLANTA Your last dose was: Your next dose is: Take 30 mL by mouth every four (4) hours as needed. 30 mL  
    
   
   
   
  
 cloNIDine HCl 0.1 mg tablet Commonly known as:  CATAPRES Your last dose was: Your next dose is: Take 0.1 mg by mouth two (2) times a day. 0.1 mg  
    
   
   
   
  
 diphenhydrAMINE 50 mg/mL Syrg Commonly known as:  BENADRYL Your last dose was: Your next dose is:    
   
   
 25 mg by Injection route. 25 mg  
    
   
   
   
  
 FLONASE ALLERGY RELIEF 50 mcg/actuation nasal spray Generic drug:  fluticasone Your last dose was: Your next dose is: 2 Sprays by Both Nostrils route daily. 2 Spray  
    
   
   
   
  
 haloperidol 5 mg tablet Commonly known as:  HALDOL Your last dose was: Your next dose is: Take 5 mg by mouth every four (4) hours as needed. 5 mg  
    
   
   
   
  
 lisinopril 10 mg tablet Commonly known as:  Yunior Rosales  
   
 Your last dose was: Your next dose is: Take 10 mg by mouth daily. 10 mg  
    
   
   
   
  
 loperamide 2 mg capsule Commonly known as:  IMODIUM Your last dose was: Your next dose is: Take 2 mg by mouth. 2 mg  
    
   
   
   
  
 risperiDONE 2 mg tablet Commonly known as:  RisperDAL Your last dose was: Your next dose is: Take  by mouth. Takes 2 mg in am, takes 4 mg at bedtime. TYLENOL 325 mg tablet Generic drug:  acetaminophen Your last dose was: Your next dose is: Take 650 mg by mouth every four (4) hours as needed for Pain. 650 mg  
    
   
   
   
  
  
STOP taking these medications   
 haloperidol lactate 5 mg/mL injection Commonly known as:  HALDOL  
   
  
 thiamine  mg tablet Commonly known as:  B-1  
   
  
 valproic acid (as sodium salt) 250 mg/5 mL Syrg

## 2018-07-13 LAB
ANION GAP SERPL CALC-SCNC: 5 MMOL/L (ref 7–16)
BASOPHILS # BLD: 0 K/UL (ref 0–0.2)
BASOPHILS NFR BLD: 1 % (ref 0–2)
BUN SERPL-MCNC: 9 MG/DL (ref 8–23)
CALCIUM SERPL-MCNC: 7.7 MG/DL (ref 8.3–10.4)
CHLORIDE SERPL-SCNC: 101 MMOL/L (ref 98–107)
CO2 SERPL-SCNC: 29 MMOL/L (ref 21–32)
CREAT SERPL-MCNC: 0.65 MG/DL (ref 0.8–1.5)
DIFFERENTIAL METHOD BLD: ABNORMAL
EOSINOPHIL # BLD: 0.1 K/UL (ref 0–0.8)
EOSINOPHIL NFR BLD: 3 % (ref 0.5–7.8)
ERYTHROCYTE [DISTWIDTH] IN BLOOD BY AUTOMATED COUNT: 14.2 % (ref 11.9–14.6)
GLUCOSE SERPL-MCNC: 88 MG/DL (ref 65–100)
HCT VFR BLD AUTO: 39.6 % (ref 41.1–50.3)
HGB BLD-MCNC: 13.7 G/DL (ref 13.6–17.2)
IMM GRANULOCYTES # BLD: 0 K/UL (ref 0–0.5)
IMM GRANULOCYTES NFR BLD AUTO: 1 % (ref 0–5)
LYMPHOCYTES # BLD: 0.6 K/UL (ref 0.5–4.6)
LYMPHOCYTES NFR BLD: 20 % (ref 13–44)
MCH RBC QN AUTO: 32.5 PG (ref 26.1–32.9)
MCHC RBC AUTO-ENTMCNC: 34.6 G/DL (ref 31.4–35)
MCV RBC AUTO: 94.1 FL (ref 79.6–97.8)
MONOCYTES # BLD: 0.6 K/UL (ref 0.1–1.3)
MONOCYTES NFR BLD: 19 % (ref 4–12)
NEUTS SEG # BLD: 1.8 K/UL (ref 1.7–8.2)
NEUTS SEG NFR BLD: 56 % (ref 43–78)
PLATELET # BLD AUTO: 40 K/UL (ref 150–450)
PMV BLD AUTO: 10.6 FL (ref 10.8–14.1)
POTASSIUM SERPL-SCNC: 4 MMOL/L (ref 3.5–5.1)
RBC # BLD AUTO: 4.21 M/UL (ref 4.23–5.67)
SODIUM SERPL-SCNC: 135 MMOL/L (ref 136–145)
WBC # BLD AUTO: 3.1 K/UL (ref 4.3–11.1)

## 2018-07-13 PROCEDURE — 80048 BASIC METABOLIC PNL TOTAL CA: CPT | Performed by: INTERNAL MEDICINE

## 2018-07-13 PROCEDURE — 36415 COLL VENOUS BLD VENIPUNCTURE: CPT | Performed by: INTERNAL MEDICINE

## 2018-07-13 PROCEDURE — 85025 COMPLETE CBC W/AUTO DIFF WBC: CPT | Performed by: INTERNAL MEDICINE

## 2018-07-13 RX ADMIN — Medication 5 ML: at 06:16

## 2018-07-13 RX ADMIN — Medication 5 ML: at 22:07

## 2018-07-13 NOTE — PROGRESS NOTES
Gerhard called [address] [place] [address] and asked Rn to fax pt's d/c medication list. Also pt still has belongings and his house key at [address] [place] [address]. Rn spoke with pt's sis in law who states they will go by facility and get pt's items. Med list given to Md to review and then to Vianca Helms who will be here in am. Follow up mental health kwame given to Rn as well. Karyle Sayjulius Hennessy informed pt adm from [address] [place] [address] last night. Per [address] [place] [address] notes, hospital was planning to discharge pt that day and moises labs that were abnormal. Pt is a 58 yr old gentleman who lives in [place], [place]. Pt has a brother and sister in law who are involved with his care. Gerhard rec a call from male therapist at . Jcarlos 53 pt has a follow up appointment at [facility] [place] on [id number], July 17 at 2pm ([phone]). Pt was not seen during rounds today as Md stated he may be discharged later today. Unsure at this time. Gerhard spoke with pt who is wondering if he is going because he is trying to coord at ride with his brother.  Gerhard cont to follow and assist. Karyle Sayres

## 2018-07-13 NOTE — PROGRESS NOTES
Called [facility] regarding commitment status. Spoke with Lise Araujo, she states the patient was released from commitment on June 22, 2018.

## 2018-07-13 NOTE — PROGRESS NOTES
Shift assessment complete. Pt alert and oriented. Pt denies pain. Respirations even and unlabored. No needs voiced at this time. Sitter at bedside. Pt instructed to call for any assistance.

## 2018-07-13 NOTE — DISCHARGE SUMMARY
Hospitalist Discharge Summary     Admit Date:  2018  2:34 PM   Name:  Rsoendo Larsen   Age:  58 y.o.  :  1955   MRN:  [id number]   PCP:  Margo Alcaraz MD  Treatment Team: Attending Provider: Neeraj Tillman MD; Consulting Provider: Gladis Allison MD; Consulting Provider: Nuno Lewis MD    Problem List for this Hospitalization:  Hospital Problems as of 2018  Never Reviewed          Codes Class Noted - Resolved POA    Pancytopenia ([facility] ICD-10-CM: W07.278  ICD-9-CM: 284.19  2018 - Present Unknown        Bipolar 1 disorder ([facility] ICD-10-CM: F31.9  ICD-9-CM: 296.7  2018 - Present Unknown        HTN (hypertension) ICD-10-CM: I10  ICD-9-CM: 401.9  2018 - Present Unknown        GERD (gastroesophageal reflux disease) ICD-10-CM: K21.9  ICD-9-CM: 530.81  2018 - Present Unknown        Seizures ([facility] ICD-10-CM: R56.9  ICD-9-CM: 780.39  2018 - Present Unknown                Admission HPI from 2018:    CC:  Leg rash  HPI:  59 yo male who is referred her from in [facility] facility for petechial rash eval.  Pt states he noticed rash bilat ant shins 1 1/2 days ago. No prior hx of same. He is on [facility] psy[facility] meds including valproc acid. States he has been on these meds long time and no change in dose (although pt does not appear to be reliable historian, nor is he knowledgeable about the names of the meds he is taking). Pt CBC noteworthy for WBC 2.6, Hb 14, plt 30, MPV 10.5. I do not see old labs to compare.     Valproic Acid level 107 (N=)  Pt denies fevers or chills. No weight loss. States \"I'm in the best health of my life\"      Hospital Course:  Uneventful. Pt was seen by Hematology who recc dc Valproic acid and reassess counts. B12 and folate nml. Neuro eval recc dc all AED unless on review of records indicate otherwise. It appears he has had only one seizure and thus AED not indicated. Will verify prior to dc.     Otherwise pt doing well and stable for dc.    Repeat CBC in 1 week, and 3 weeks  Follow up PCP and follow up with hematology if abn in CBC persists. 1600:  Records reviewed no prior hx seizures. Pt denies having had seizures. Will hold all AED and follow up with PCP. Recc no driving until follow up with PCP and if Ok'ed to drive at that time.               Follow up instructions below. Plan was discussed with pt. All questions answered. Patient was stable at time of discharge and was instructed to call or return if there are any concerns or recurrence of symptoms. Diagnostic Imaging/Tests:   No results found. Echocardiogram results:  No results found for this visit on 07/12/18.       All Micro Results     None          Labs: Results:       BMP, Mg, Phos Recent Labs      07/13/18   0657  07/12/18   1435   NA  135*  135*   K  4.0  3.9   CL  101  100   CO2  29  30   AGAP  5*  5*   BUN  9  7*   CREA  0.65*  0.72*   CA  7.7*  8.1*   GLU  88  120*   MG   --   1.9   PHOS   --   4.3*      CBC Recent Labs      07/13/18   0657  07/12/18   1435   WBC  3.1*  2.6*   RBC  4.21*  4.32   HGB  13.7  14.0   HCT  39.6*  41.1   PLT  40*  30*   GRANS  56  65   LYMPH  20  20   EOS  3  2   MONOS  19*  12   BASOS  1  0   IG  1  1   ANEU  1.8  1.7   ABL  0.6  0.5   JARRED  0.1  0.1   ABM  0.6  0.3   ABB  0.0  0.0   AIG  0.0  0.0      LFT Recent Labs      07/12/18   1435   SGOT  60*   ALT  79*   AP  100   TP  6.8   ALB  2.9*   GLOB  3.9*   AGRAT  0.7*      Cardiac Testing No results found for: BNPP, BNP, CPK, RCK1, RCK2, RCK3, RCK4, CKMB, CKNDX, CKND1, TROPT, TROIQ   Coagulation Tests Lab Results   Component Value Date/Time    Prothrombin time 15.1 (H) 07/12/2018 02:35 PM    INR 1.2 07/12/2018 02:35 PM    aPTT 30.7 07/12/2018 02:35 PM      A1c No results found for: HBA1C, HGBE8, QKS6ECOE   Lipid Panel No results found for: CHOL, CHOLPOCT, CHOLX, CHLST, CHOLV, 622435, HDL, LDL, LDLC, DLDLP, 009707, VLDLC, VLDL, TGLX, TRIGL, TRIGP, TGLPOCT, CHHD, CHHDX   Thyroid Panel No results found for: T4, T3U, TSH, TSHEXT     Most Recent UA No results found for: COLOR, APPRN, REFSG, KATELYN, PROTU, GLUCU, KETU, BILU, BLDU, UROU, FRANK, LEUKU     No Known Allergies    There is no immunization history on file for this patient. All Labs from Last 24 Hrs:  Recent Results (from the past 24 hour(s))   GLUCOSE, POC    Collection Time: 07/12/18 10:29 PM   Result Value Ref Range    Glucose (POC) 162 (H) 65 - 660 mg/dL   METABOLIC PANEL, BASIC    Collection Time: 07/13/18  6:57 AM   Result Value Ref Range    Sodium 135 (L) 136 - 145 mmol/L    Potassium 4.0 3.5 - 5.1 mmol/L    Chloride 101 98 - 107 mmol/L    CO2 29 21 - 32 mmol/L    Anion gap 5 (L) 7 - 16 mmol/L    Glucose 88 65 - 100 mg/dL    BUN 9 8 - 23 MG/DL    Creatinine 0.65 (L) 0.8 - 1.5 MG/DL    GFR est AA >60 >60 ml/min/1.73m2    GFR est non-AA >60 >60 ml/min/1.73m2    Calcium 7.7 (L) 8.3 - 10.4 MG/DL   CBC WITH AUTOMATED DIFF    Collection Time: 07/13/18  6:57 AM   Result Value Ref Range    WBC 3.1 (L) 4.3 - 11.1 K/uL    RBC 4.21 (L) 4.23 - 5.67 M/uL    HGB 13.7 13.6 - 17.2 g/dL    HCT 39.6 (L) 41.1 - 50.3 %    MCV 94.1 79.6 - 97.8 FL    MCH 32.5 26.1 - 32.9 PG    MCHC 34.6 31.4 - 35.0 g/dL    RDW 14.2 11.9 - 14.6 %    PLATELET 40 (L) 285 - 450 K/uL    MPV 10.6 (L) 10.8 - 14.1 FL    DF AUTOMATED      NEUTROPHILS 56 43 - 78 %    LYMPHOCYTES 20 13 - 44 %    MONOCYTES 19 (H) 4.0 - 12.0 %    EOSINOPHILS 3 0.5 - 7.8 %    BASOPHILS 1 0.0 - 2.0 %    IMMATURE GRANULOCYTES 1 0.0 - 5.0 %    ABS. NEUTROPHILS 1.8 1.7 - 8.2 K/UL    ABS. LYMPHOCYTES 0.6 0.5 - 4.6 K/UL    ABS. MONOCYTES 0.6 0.1 - 1.3 K/UL    ABS. EOSINOPHILS 0.1 0.0 - 0.8 K/UL    ABS. BASOPHILS 0.0 0.0 - 0.2 K/UL    ABS. IMM.  GRANS. 0.0 0.0 - 0.5 K/UL       Discharge Exam:  Patient Vitals for the past 24 hrs:   Temp Pulse Resp BP SpO2   07/13/18 1150 97.8 °F (36.6 °C) 69 17 (!) 149/100 98 %   07/13/18 0835 97.3 °F (36.3 °C) 66 19 131/86 94 %   07/13/18 0453 97.5 °F (36.4 °C) 69 18 130/89 96 % 07/13/18 0102 97.4 °F (36.3 °C) 82 18 (!) 137/93 98 %   07/12/18 2018 96.7 °F (35.9 °C) 96 20 (!) 150/108 99 %   07/12/18 1942 - - - - 97 %   07/12/18 1718 97.4 °F (36.3 °C) 74 18 (!) 155/101 97 %   07/12/18 1702 97.9 °F (36.6 °C) 75 16 140/90 98 %     Oxygen Therapy  O2 Sat (%): 98 % (07/13/18 1150)  O2 Device: Room air (07/12/18 1942)  No intake or output data in the 24 hours ending 07/13/18 1456    General:    Well nourished. Alert. No distress. Eyes:   Normal sclera. Extraocular movements intact. ENT:  Normocephalic, atraumatic. Moist mucous membranes  CV:   Regular rate and rhythm. No murmur, rub, or gallop. Lungs:  Clear to auscultation bilaterally. No wheezing, rhonchi, or rales. Abdomen: Soft, nontender, nondistended. Bowel sounds normal.   Extremities: Warm and dry. No cyanosis or edema. Neurologic: CN II-XII grossly intact. Sensation intact. Skin:     Diffuse petechial rash   Psych:  Normal mood and affect. Discharge Info:   Current Discharge Medication List            Disposition: home    Activity: Activity as tolerated  Diet: DIET REGULAR    No orders of the defined types were placed in this encounter. Follow-up Information     None          Time spent in patient discharge planning and coordination 35 minutes.
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
## 2018-07-13 NOTE — PROGRESS NOTES
Pt A+OX4, ambulating in room with no complaints this morning. Pt with BLE red raised rash with no itching, pain, numbness or tingling. Pt stated that rash is \"about the same\" as yesterday. Pt requesting igor for legs and RN will ask doctor.

## 2018-07-14 VITALS
OXYGEN SATURATION: 98 % | DIASTOLIC BLOOD PRESSURE: 78 MMHG | TEMPERATURE: 97.6 F | SYSTOLIC BLOOD PRESSURE: 128 MMHG | RESPIRATION RATE: 16 BRPM | HEART RATE: 67 BPM

## 2018-07-14 LAB
ANION GAP SERPL CALC-SCNC: 3 MMOL/L (ref 7–16)
BASOPHILS # BLD: 0 K/UL (ref 0–0.2)
BASOPHILS NFR BLD: 0 % (ref 0–2)
BUN SERPL-MCNC: 10 MG/DL (ref 8–23)
CALCIUM SERPL-MCNC: 8.5 MG/DL (ref 8.3–10.4)
CHLORIDE SERPL-SCNC: 102 MMOL/L (ref 98–107)
CO2 SERPL-SCNC: 31 MMOL/L (ref 21–32)
CREAT SERPL-MCNC: 0.69 MG/DL (ref 0.8–1.5)
DIFFERENTIAL METHOD BLD: ABNORMAL
EOSINOPHIL # BLD: 0.1 K/UL (ref 0–0.8)
EOSINOPHIL NFR BLD: 2 % (ref 0.5–7.8)
ERYTHROCYTE [DISTWIDTH] IN BLOOD BY AUTOMATED COUNT: 14.6 % (ref 11.9–14.6)
GLUCOSE SERPL-MCNC: 90 MG/DL (ref 65–100)
HCT VFR BLD AUTO: 45.1 % (ref 41.1–50.3)
HGB BLD-MCNC: 14.9 G/DL (ref 13.6–17.2)
IMM GRANULOCYTES # BLD: 0 K/UL (ref 0–0.5)
IMM GRANULOCYTES NFR BLD AUTO: 1 % (ref 0–5)
LYMPHOCYTES # BLD: 0.7 K/UL (ref 0.5–4.6)
LYMPHOCYTES NFR BLD: 22 % (ref 13–44)
MCH RBC QN AUTO: 31.6 PG (ref 26.1–32.9)
MCHC RBC AUTO-ENTMCNC: 33 G/DL (ref 31.4–35)
MCV RBC AUTO: 95.8 FL (ref 79.6–97.8)
MONOCYTES # BLD: 0.5 K/UL (ref 0.1–1.3)
MONOCYTES NFR BLD: 14 % (ref 4–12)
NEUTS SEG # BLD: 1.9 K/UL (ref 1.7–8.2)
NEUTS SEG NFR BLD: 61 % (ref 43–78)
PLATELET # BLD AUTO: 39 K/UL (ref 150–450)
PMV BLD AUTO: 10.7 FL (ref 10.8–14.1)
POTASSIUM SERPL-SCNC: 4.1 MMOL/L (ref 3.5–5.1)
RBC # BLD AUTO: 4.71 M/UL (ref 4.23–5.67)
SODIUM SERPL-SCNC: 136 MMOL/L (ref 136–145)
WBC # BLD AUTO: 3.2 K/UL (ref 4.3–11.1)

## 2018-07-14 PROCEDURE — 80048 BASIC METABOLIC PNL TOTAL CA: CPT | Performed by: INTERNAL MEDICINE

## 2018-07-14 PROCEDURE — 85025 COMPLETE CBC W/AUTO DIFF WBC: CPT | Performed by: INTERNAL MEDICINE

## 2018-07-14 PROCEDURE — 36415 COLL VENOUS BLD VENIPUNCTURE: CPT | Performed by: INTERNAL MEDICINE

## 2018-07-14 RX ADMIN — Medication 5 ML: at 06:11

## 2018-07-14 NOTE — DISCHARGE INSTRUCTIONS
DISCHARGE SUMMARY from Nurse    PATIENT INSTRUCTIONS:    After general anesthesia or intravenous sedation, for 24 hours or while taking prescription Narcotics:  · Limit your activities  · Do not drive and operate hazardous machinery  · Do not make important personal or business decisions  · Do  not drink alcoholic beverages  · If you have not urinated within 8 hours after discharge, please contact your surgeon on call. Report the following to your surgeon:  · Excessive pain, swelling, redness or odor of or around the surgical area  · Temperature over 100.5  · Nausea and vomiting lasting longer than 4 hours or if unable to take medications  · Any signs of decreased circulation or nerve impairment to extremity: change in color, persistent  numbness, tingling, coldness or increase pain  · Any questions    What to do at Home:  Recommended activity: Activity as tolerated,     If you experience any of the following symptoms elevated temperature 100.5, fatigue, easy bruisability,  please follow up with physician. *  Please give a list of your current medications to your Primary Care Provider. *  Please update this list whenever your medications are discontinued, doses are      changed, or new medications (including over-the-counter products) are added. *  Please carry medication information at all times in case of emergency situations. These are general instructions for a healthy lifestyle:    No smoking/ No tobacco products/ Avoid exposure to second hand smoke  Surgeon General's Warning:  Quitting smoking now greatly reduces serious risk to your health.     Obesity, smoking, and sedentary lifestyle greatly increases your risk for illness    A healthy diet, regular physical exercise & weight monitoring are important for maintaining a healthy lifestyle    You may be retaining fluid if you have a history of heart failure or if you experience any of the following symptoms:  Weight gain of 3 pounds or more overnight or 5 pounds in a week, increased swelling in our hands or feet or shortness of breath while lying flat in bed. Please call your doctor as soon as you notice any of these symptoms; do not wait until your next office visit. Recognize signs and symptoms of STROKE:    F-face looks uneven    A-arms unable to move or move unevenly    S-speech slurred or non-existent    T-time-call 911 as soon as signs and symptoms begin-DO NOT go       Back to bed or wait to see if you get better-TIME IS BRAIN. Warning Signs of HEART ATTACK     Call 911 if you have these symptoms:   Chest discomfort. Most heart attacks involve discomfort in the center of the chest that lasts more than a few minutes, or that goes away and comes back. It can feel like uncomfortable pressure, squeezing, fullness, or pain.  Discomfort in other areas of the upper body. Symptoms can include pain or discomfort in one or both arms, the back, neck, jaw, or stomach.  Shortness of breath with or without chest discomfort.  Other signs may include breaking out in a cold sweat, nausea, or lightheadedness. Don't wait more than five minutes to call 911 - MINUTES MATTER! Fast action can save your life. Calling 911 is almost always the fastest way to get lifesaving treatment. Emergency Medical Services staff can begin treatment when they arrive -- up to an hour sooner than if someone gets to the hospital by car. The discharge information has been reviewed with the patient. The patient verbalized understanding. Discharge medications reviewed with the patient and appropriate educational materials and side effects teaching were provided.   ___________________________________________________________________________________________________________________________________

## 2018-07-14 NOTE — PROGRESS NOTES
Hospitalist Progress Note Admit Date:  2018  2:34 PM  
Name:  Heidi Pastrana Age:  58 y.o. 
:  1955 MRN:  [id number] PCP:  Dania Fenton MD 
Treatment Team: Attending Provider: Amanda Bates MD; Consulting Provider: Deejay Giron MD; Consulting Provider: Amee Gil MD 
 
Subjective:  
 
CC:  Rash on legs Pt could not arrange transport home till this am.  No complaints today. See yest full dc summary. Stable for dc Objective:  
Patient Vitals for the past 24 hrs: 
 Temp Pulse Resp BP SpO2  
18 0422 97.3 °F (36.3 °C) 65 16 129/85 96 % 18 0018 97.2 °F (36.2 °C) 64 16 (!) 148/101 97 % 18 1917 98 °F (36.7 °C) 64 16 127/84 98 % 18 1615 97.8 °F (36.6 °C) 80 18 (!) 144/92 98 % 18 1150 97.8 °F (36.6 °C) 69 17 (!) 149/100 98 % Oxygen Therapy O2 Sat (%): 96 % (18 0422) O2 Device: Room air (18 1545) No intake or output data in the 24 hours ending 18 0845 General:    Well nourished. Alert. CV:   RRR. No murmur, rub, or gallop. Lungs:   CTAB. No wheezing, rhonchi, or rales. Abdomen:   Soft, nontender, nondistended. Extremities: Diffuse petechial rash Skin:     No rashes or jaundice. Data Review: 
I have reviewed all labs, meds, telemetry events, and studies from the last 24 hours. Recent Results (from the past 24 hour(s)) METABOLIC PANEL, BASIC Collection Time: 18  6:05 AM  
Result Value Ref Range Sodium 136 136 - 145 mmol/L Potassium 4.1 3.5 - 5.1 mmol/L Chloride 102 98 - 107 mmol/L  
 CO2 31 21 - 32 mmol/L Anion gap 3 (L) 7 - 16 mmol/L Glucose 90 65 - 100 mg/dL BUN 10 8 - 23 MG/DL Creatinine 0.69 (L) 0.8 - 1.5 MG/DL  
 GFR est AA >60 >60 ml/min/1.73m2 GFR est non-AA >60 >60 ml/min/1.73m2 Calcium 8.5 8.3 - 10.4 MG/DL  
CBC WITH AUTOMATED DIFF Collection Time: 18  6:05 AM  
Result Value Ref Range  WBC 3.2 (L) 4.3 - 11.1 K/uL  
 RBC 4.71 4.23 - 5.67 M/uL  
 HGB 14.9 13.6 - 17.2 g/dL HCT 45.1 41.1 - 50.3 % MCV 95.8 79.6 - 97.8 FL  
 MCH 31.6 26.1 - 32.9 PG  
 MCHC 33.0 31.4 - 35.0 g/dL  
 RDW 14.6 11.9 - 14.6 % PLATELET 39 (L) 431 - 450 K/uL MPV 10.7 (L) 10.8 - 14.1 FL  
 DF AUTOMATED NEUTROPHILS 61 43 - 78 % LYMPHOCYTES 22 13 - 44 % MONOCYTES 14 (H) 4.0 - 12.0 % EOSINOPHILS 2 0.5 - 7.8 % BASOPHILS 0 0.0 - 2.0 % IMMATURE GRANULOCYTES 1 0.0 - 5.0 %  
 ABS. NEUTROPHILS 1.9 1.7 - 8.2 K/UL  
 ABS. LYMPHOCYTES 0.7 0.5 - 4.6 K/UL  
 ABS. MONOCYTES 0.5 0.1 - 1.3 K/UL  
 ABS. EOSINOPHILS 0.1 0.0 - 0.8 K/UL  
 ABS. BASOPHILS 0.0 0.0 - 0.2 K/UL  
 ABS. IMM. GRANS. 0.0 0.0 - 0.5 K/UL All Micro Results None Current Meds: 
Current Facility-Administered Medications Medication Dose Route Frequency  sodium chloride (NS) flush 5-10 mL  5-10 mL IntraVENous Q8H  
 sodium chloride (NS) flush 5-10 mL  5-10 mL IntraVENous PRN  
 acetaminophen (TYLENOL) tablet 650 mg  650 mg Oral Q4H PRN  
 HYDROcodone-acetaminophen (NORCO) 5-325 mg per tablet 1 Tab  1 Tab Oral Q4H PRN  
 naloxone (NARCAN) injection 0.4 mg  0.4 mg IntraVENous PRN  
 diphenhydrAMINE (BENADRYL) injection 25 mg  25 mg IntraVENous Q4H PRN  
 ondansetron (ZOFRAN) injection 4 mg  4 mg IntraVENous Q4H PRN  
 senna-docusate (PERICOLACE) 8.6-50 mg per tablet 2 Tab  2 Tab Oral DAILY PRN  
 LORazepam (ATIVAN) tablet 1 mg  1 mg Oral Q4H PRN  
 zolpidem (AMBIEN) tablet 5 mg  5 mg Oral QHS PRN Other Studies (last 24 hours): No results found. Assessment and Plan:  
 
Hospital Problems as of 7/14/2018  Never Reviewed Codes Class Noted - Resolved POA Pancytopenia ([facility] ICD-10-CM: N89.747 ICD-9-CM: 284.19  7/12/2018 - Present Unknown Bipolar 1 disorder (H[facility]) ICD-10-CM: F31.9 ICD-9-CM: 296.7  7/12/2018 - Present Unknown HTN (hypertension) ICD-10-CM: I10 
ICD-9-CM: 401.9  7/12/2018 - Present Unknown  GERD (gastroesophageal reflux disease) ICD-10-CM: K21.9 ICD-9-CM: 530.81  7/12/2018 - Present Unknown Seizures ([address] ICD-10-CM: R56.9 ICD-9-CM: 780.39  7/12/2018 - Present Unknown PLAN:   
Home Plan as fully outlined in dc summary on 7/13/18 DC planning/Dispo: DVT ppx:   
 
Signed: 
Felecia Hale MD

## 2018-07-14 NOTE — PROGRESS NOTES
Shift assessment complete. Pt alert and oriented. Respirations even and unlabored. Pt denies pain. BLE red rash. No needs voiced at this time. All safety measures in place.